# Patient Record
Sex: FEMALE | Race: WHITE
[De-identification: names, ages, dates, MRNs, and addresses within clinical notes are randomized per-mention and may not be internally consistent; named-entity substitution may affect disease eponyms.]

---

## 2018-07-12 ENCOUNTER — HOSPITAL ENCOUNTER (OUTPATIENT)
Dept: HOSPITAL 92 - BICULT | Age: 83
Discharge: HOME | End: 2018-07-12
Attending: FAMILY MEDICINE
Payer: MEDICARE

## 2018-07-12 DIAGNOSIS — N28.1: ICD-10-CM

## 2018-07-12 DIAGNOSIS — R94.6: Primary | ICD-10-CM

## 2018-07-12 DIAGNOSIS — R63.4: ICD-10-CM

## 2018-07-12 DIAGNOSIS — E04.2: ICD-10-CM

## 2018-07-12 PROCEDURE — 74178 CT ABD&PLV WO CNTR FLWD CNTR: CPT

## 2018-07-12 PROCEDURE — 76536 US EXAM OF HEAD AND NECK: CPT

## 2018-07-30 ENCOUNTER — HOSPITAL ENCOUNTER (OUTPATIENT)
Dept: HOSPITAL 92 - ULT | Age: 83
Discharge: HOME | End: 2018-07-30
Attending: OTOLARYNGOLOGY
Payer: MEDICARE

## 2018-07-30 VITALS — SYSTOLIC BLOOD PRESSURE: 139 MMHG | TEMPERATURE: 98 F | DIASTOLIC BLOOD PRESSURE: 83 MMHG

## 2018-07-30 VITALS — BODY MASS INDEX: 19 KG/M2

## 2018-07-30 DIAGNOSIS — Z79.899: ICD-10-CM

## 2018-07-30 DIAGNOSIS — Z88.0: ICD-10-CM

## 2018-07-30 DIAGNOSIS — E04.2: Primary | ICD-10-CM

## 2018-07-30 DIAGNOSIS — I10: ICD-10-CM

## 2018-07-30 PROCEDURE — 76942 ECHO GUIDE FOR BIOPSY: CPT

## 2018-07-30 PROCEDURE — 88173 CYTOPATH EVAL FNA REPORT: CPT

## 2018-07-30 PROCEDURE — 0G9H3ZX DRAINAGE OF RIGHT THYROID GLAND LOBE, PERCUTANEOUS APPROACH, DIAGNOSTIC: ICD-10-PCS | Performed by: RADIOLOGY

## 2018-07-30 PROCEDURE — 10022: CPT

## 2018-07-30 NOTE — ULT
SONOGRAPHIC GUIDED RIGHT THYROID MASS FNA:

 

History: Right thyroid mass. 

 

FINDINGS: 

After explaining the procedure and answering all questions, sonographic survey was performed. The lob
ular masses involving the right thyroid lobe were again demonstrated. The more concerning heterogeneo
us mass as the superior pole right thyroid lobe that is more superior and superficial contains intern
al microcalcifications and was chosen for sampling. The more posterior and inferior lesion is well ci
rcumscribed and favored not to need sampling. 

 

Sterile technique, buffered local anesthesia, sonographic guidance and medial approach were used to c
arefully advance a 25 gauge needle into the heterogeneous mass. A total of four passes were obtained 
and submitted to pathology for evaluation. Patient tolerated the procedure well and was dismissed in 
good condition. 

 

IMPRESSION: 

Technically successful right thyroid lobe mass FNA. Pathology is pending. 

 

POS: [facility]